# Patient Record
Sex: MALE | Race: WHITE | NOT HISPANIC OR LATINO | Employment: OTHER | URBAN - METROPOLITAN AREA
[De-identification: names, ages, dates, MRNs, and addresses within clinical notes are randomized per-mention and may not be internally consistent; named-entity substitution may affect disease eponyms.]

---

## 2018-08-03 ENCOUNTER — TELEPHONE (OUTPATIENT)
Dept: NEUROSURGERY | Facility: CLINIC | Age: 78
End: 2018-08-03

## 2018-08-03 DIAGNOSIS — I65.23 BILATERAL CAROTID ARTERY STENOSIS: Primary | ICD-10-CM

## 2018-08-03 NOTE — TELEPHONE ENCOUNTER
"Provider:  Seymour  Caller: Dr. Joel Dumont  Time of call:   501  Phone #:  [phone]  Last visit: unknown     Next visit: none       Reason for call:         Dr. Joel Dumont from [facility] left message stating that the pt had a week history of light-headedness which was initially attributed to a GI bleed. He was \"8.7 hemoccult positive\". He wants to d/c his Plavix regime unless we have an objection.   "

## 2018-08-06 NOTE — TELEPHONE ENCOUNTER
We cannot comment on that when we have no records. Patient has not been seen since 2015 from what I can tell. He needs a follow up with carotid duplex.